# Patient Record
Sex: FEMALE
[De-identification: names, ages, dates, MRNs, and addresses within clinical notes are randomized per-mention and may not be internally consistent; named-entity substitution may affect disease eponyms.]

---

## 2019-12-20 LAB
BASOPHILS # (AUTO): 0.1 K/MM3 (ref 0–0.1)
BASOPHILS NFR BLD AUTO: 2.8 % (ref 0–1.8)
EOSINOPHIL # BLD AUTO: 0.1 K/MM3 (ref 0–0.4)
EOSINOPHIL NFR BLD AUTO: 2.4 % (ref 0–4.3)
HCT VFR BLD CALC: 31 % (ref 30.3–42.9)
HGB BLD-MCNC: 10 GM/DL (ref 10.1–14.3)
LYMPHOCYTES # BLD AUTO: 1.4 K/MM3 (ref 1.2–5.4)
LYMPHOCYTES NFR BLD AUTO: 43 % (ref 13.4–35)
MCHC RBC AUTO-ENTMCNC: 32 % (ref 30–34)
MCV RBC AUTO: 86 FL (ref 79–97)
MONOCYTES # (AUTO): 0.2 K/MM3 (ref 0–0.8)
MONOCYTES % (AUTO): 7 % (ref 0–7.3)
PLATELET # BLD: 226 K/MM3 (ref 140–440)
RBC # BLD AUTO: 3.6 M/MM3 (ref 3.65–5.03)

## 2019-12-20 NOTE — ANESTHESIA CONSULTATION
Anesthesia Consult and Med Hx


Date of service: 12/24/19





- Airway


Anesthetic Teeth Evaluation: Good


ROM Head & Neck: Adequate


Mental/Hyoid Distance: Adequate


Mallampati Class: Class II


Intubation Access Assessment: Probably Good





- Pulmonary Exam


CTA: Yes





- Cardiac Exam


Cardiac Exam: RRR





- Pre-Operative Health Status


ASA Pre-Surgery Classification: ASA2


Proposed Anesthetic Plan: General


Nerve Block: TAP Block





- Pulmonary


Hx Smoking: No


Hx Respiratory Symptoms: No





- Cardiovascular System


Hx Hypertension: No





- Central Nervous System


CVA: No





- Gastrointestinal


Hx Gastroesophageal Reflux Disease: Yes (controlled)





- Endocrine


Hx Renal Disease: No


Hx Liver Disease: No


Hx Non-Insulin Dependent Diabetes: No


Hx Thyroid Disease: No





- Hematic


Hx Anemia: Yes





- Additional Comments


Anesthesia Medical History Comments: No hx anesthetic complications.

## 2019-12-21 NOTE — HISTORY AND PHYSICAL REPORT
History of Present Illness


Date of examination: 19


Chief complaint: 


 1. Excessive and frequent menstruation with irregular cycle


2. Fibroids of  uterus; Intramural


3. Dysmenorrhea


4. 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





History of present illness: 


Past Pregnancy History 


   :      0








GYN History 


Operations: Myomectomy ()


Breast Biopsy:  ()(L) benign


Abnormal PAP: negative





Infection History 


HIV Risk Eval: no


Hx of STD: None





Active Medications (reviewed today):


MULTIVITAMINS ORAL CAPSULE (MULTIPLE VITAMIN) 


IRON TABLET (FERROUS GLUCONATE TABS) 





Current Allergies (reviewed today):


No known allergies








Past Medical History:


   Reviewed history from 2019 and no changes required:


      Anemia


      Herniated L4-L5()





Past Surgical History:


   Reviewed history from 2019 and no changes required:


      Myomectomy ()


      Breast Biopsy:  ()(L) benign





Family History Summary: 


   Reviewed history and no changes required: 2019


MGM - Has Family History of Lung Cancer - MGGM - Entered On: 2019


Uncle - Has Family History of Lung Cancer - MGG uncle - Entered On: 2019


Other family member - Has No Family History of Biliary Tract Cancer - Entered 

On: 2019


Other family member - Has No Family History of Breast Cancer - Entered On: 

2019


Other family member - Has No Family History of Brain Cancer - Entered On: 

2019


Other family member - Has No Family History of Colon Cancer - Entered On: 

2019


Other family member - Has No Family History of Spontaneous DVT-PE - Entered On: 

2019


Other family member - Has No Family History of Kidney/Urinary Tract Cancer - 

Entered On: 2019


Other family member - Has No Family History of Ovarvian Cancer - Entered On: 

2019


Other family member - Has No Family History of Pancreatic Cancer - Entered On: 

2019


Other family member - Has No Family History of Stomach Cancer - Entered On: 

2019


Other family member - Has No Family History of Small Bowel Cancer - Entered On: 

2019


Other family member - Has No Family History of Uterine Cancer - Entered On: 

2019





General Comments - FH:


MGF multiple myeloma





Social History:


   Reviewed history from 2019 and no changes required:


      Patient is single


      


      Smoking History:


      Patient has never smoked.








Risk Factors: 





Smoked Tobacco Use:  Never smoker


Smokeless Tobacco Use:  Never


Drug use:  no


HIV high-risk behavior:  no


Alcohol use:  no


Exercise:  no


Seatbelt use:  100 %





Previous Tobacco Use: Signed On - 2019


Smoked Tobacco Use:  Never smoker


Smokeless Tobacco Use:  Never


Drug use:  no


HIV high-risk behavior:  no





Previous Alcohol Use: Signed On - 2019


Alcohol use:  no


Exercise:  no


Seatbelt use:  100 %





Mammogram History:


   Date of Last Mammogram:  2019





PAP Smear History:


   Date of Last PAP Smear:  2019














Physical Exam 


Appearance: well developed, well nourished, no acute distress





Other Exams 


Lungs: no rales, rhonchi, or wheezes


Heart: S1, S2, no murmur, rub, or gallop


Appearance:  pfannesntiel scar





Genitourinary Exam 


Uterus: enlarged; fixed











Impression & Recommendations:





Problem # 1:  Excessive and frequent menstruation with irregular cycle (ICD-

626.6) (BYY42-U05.1)





The following medications were removed from the medication list:


   Ibuprofen 800 Mg Oral Tablet (Ibuprofen) ..... 1 po tid (prn)





Her updated medication list for this problem includes:


   Multivitamins Oral Capsule (Multiple vitamin)





Diagnosis explained to patient . Discussed with patient various medical, 

surgical and radiological therapies common for treatment including, but not 

limited to, myomectomy,  hysterectomy and uterine artery embolization.  

Discussed risks and benefits of laparotomy, laparoscopy, vaginal and robotic 

assisted approaches for hysterectomies. Patient desires definitive treatment in 

the form of robot assisted laparoscopic total hysterectomy.    The risks and 

alternatives for this surgery were reviewed with the patient.  She was informed 

of  the risks of the surgery including, but not limited to, pain, infection, b

leeding possibly heavy enough to require a blood transfusion with associated 

risks of infections (hepatitis and HIV) and transfusion reactions, possible 

damage to bowel, bladder or ureter(s). Patient understands that this surgery 

with make her sterile. Indications to abort a robotic/laparoscopic procedure and

perform an open procedure were explained. Patient understands if her ovaries are

removed she will become menopausal. Patient advised the small risks of spreading

of malignancy if morcellation  is required during the surgery patient 

understands and approves performing if necessary. Questions answered. Consent 

reviewed and signed


The patient was instructed/informed the following:


   The normal length of hospital stay for this procedure.


   Nothing to eat or drink after midnight the evening prior to surgery. 


   Clear liquids the day before surgery.  


   Pre-op instruction sheets given. 


Wound care instructions given.








Problem # 2:  Fibroids of  uterus; Intramural (ICD-218.1) (FRU28-T69.1)


Diagnosis explained to patient . Questions answered. Discussed with patient mary

ious medical, surgical and radioloigal therapies common for treatment: 

Hormonal/medical therapy, fibroid embolization, removal of fibroids or 

hysterectomy She desires to proceed with hysterectomy


The following medications were removed from the medication list:


   Ibuprofen 800 Mg Oral Tablet (Ibuprofen) ..... 1 po tid (prn)





Counseling and coordination of care was >50% of the face to face time. The total

face to face time for this visit was ~40 minutes. 





Problem # 3:  Dysmenorrhea (ICD-625.3) (EUJ42-A96.6)


It was extensively explained to her that her pain may persist, recur or change 

in nature due to the difficulty with diagnosis chronic pelvic pain or 

development of adhesions. She declined other treatment options at this time. 

Questions were encouraged and answered


She desires ovarian conservation. She was informed she may require surgery later

to have her ovaries removed for a benign or mailgnant condition.


She was informed she may require a supracervical hysterectomy to avoid injury to

surrounding organs.


The following medications were removed from the medication list:


   Ibuprofen 800 Mg Oral Tablet (Ibuprofen) ..... 1 po tid (prn)





Her updated medication list for this problem includes:


   Multivitamins Oral Capsule (Multiple vitamin)








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











Medications Added to Medication List This Visit:


1)  Multivitamins Oral Capsule (Multiple vitamin)


2)  Multi Vitamins 


3)  Iron 


4)  Iron Tablet (Ferrous gluconate tabs)








]








Medications and Allergies


                                    Allergies











Allergy/AdvReac Type Severity Reaction Status Date / Time


 


No Known Allergies Allergy   Unverified 19 13:44











                                Home Medications











 Medication  Instructions  Recorded  Confirmed  Last Taken  Type


 


Iron [Iron 18 MG TAB] 18 mg PO QDAY 19 Unknown History


 


Multivitamin [Multiple Vitamins] 1 each PO DAILY 19 Unknown 

History











Active Meds: 


Active Medications





Celecoxib (Celebrex)  200 mg PO PREOP NR


   Stop: 19 23:59


Famotidine (Pepcid)  20 mg IV PREOP ONE


   Stop: 19 17:07


Fentanyl (Sublimaze)  100 mcg IV ONCE PRN


   PRN Reason: sedation for nerve block


Gabapentin (Gabapentin)  300 mg PO PREOP NR


   Stop: 19 23:59


Lactated Ringer's (Lactated Ringers)  1,000 mls @ 100 mls/hr IV AS DIRECT ELSY


Midazolam HCl (Versed)  2 mg IV PREOP NR


   Stop: 19 23:59











Exam





- Constitutional


Vitals: 


                                        











Temp Pulse Resp BP Pulse Ox


 


 97.5 F L  64   18   134/67   99 


 


 19 12:30  19 12:30  19 12:30  19 12:30  19 12:30














Results





- Labs


CBC & Chem 7: 


                                 19 12:35








Assessment and Plan





- Patient Problems


(1) Excessive and frequent menstruation with irregular cycle


Status: Chronic   





(2) Intramural leiomyoma of uterus


Status: Acute   





(3) Dysmenorrhea


Status: Chronic   





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


Status: Chronic

## 2019-12-24 ENCOUNTER — HOSPITAL ENCOUNTER (INPATIENT)
Dept: HOSPITAL 5 - 3A | Age: 49
LOS: 2 days | Discharge: HOME | DRG: 743 | End: 2019-12-26
Attending: OBSTETRICS & GYNECOLOGY | Admitting: OBSTETRICS & GYNECOLOGY
Payer: COMMERCIAL

## 2019-12-24 DIAGNOSIS — N92.1: ICD-10-CM

## 2019-12-24 DIAGNOSIS — K21.9: ICD-10-CM

## 2019-12-24 DIAGNOSIS — Z80.1: ICD-10-CM

## 2019-12-24 DIAGNOSIS — D25.1: Primary | ICD-10-CM

## 2019-12-24 DIAGNOSIS — D50.0: ICD-10-CM

## 2019-12-24 DIAGNOSIS — N94.6: ICD-10-CM

## 2019-12-24 PROCEDURE — 0HB7XZZ EXCISION OF ABDOMEN SKIN, EXTERNAL APPROACH: ICD-10-PCS | Performed by: OBSTETRICS & GYNECOLOGY

## 2019-12-24 PROCEDURE — 88307 TISSUE EXAM BY PATHOLOGIST: CPT

## 2019-12-24 PROCEDURE — 0UT70ZZ RESECTION OF BILATERAL FALLOPIAN TUBES, OPEN APPROACH: ICD-10-PCS | Performed by: OBSTETRICS & GYNECOLOGY

## 2019-12-24 PROCEDURE — 85018 HEMOGLOBIN: CPT

## 2019-12-24 PROCEDURE — 0UT90ZZ RESECTION OF UTERUS, OPEN APPROACH: ICD-10-PCS | Performed by: OBSTETRICS & GYNECOLOGY

## 2019-12-24 PROCEDURE — 81025 URINE PREGNANCY TEST: CPT

## 2019-12-24 PROCEDURE — 36415 COLL VENOUS BLD VENIPUNCTURE: CPT

## 2019-12-24 PROCEDURE — 0UT00ZZ RESECTION OF RIGHT OVARY, OPEN APPROACH: ICD-10-PCS | Performed by: OBSTETRICS & GYNECOLOGY

## 2019-12-24 PROCEDURE — 88302 TISSUE EXAM BY PATHOLOGIST: CPT

## 2019-12-24 PROCEDURE — 86901 BLOOD TYPING SEROLOGIC RH(D): CPT

## 2019-12-24 PROCEDURE — 86850 RBC ANTIBODY SCREEN: CPT

## 2019-12-24 PROCEDURE — 86900 BLOOD TYPING SEROLOGIC ABO: CPT

## 2019-12-24 PROCEDURE — 88305 TISSUE EXAM BY PATHOLOGIST: CPT

## 2019-12-24 PROCEDURE — 64450 NJX AA&/STRD OTHER PN/BRANCH: CPT

## 2019-12-24 PROCEDURE — 85014 HEMATOCRIT: CPT

## 2019-12-24 PROCEDURE — 85025 COMPLETE CBC W/AUTO DIFF WBC: CPT

## 2019-12-24 RX ADMIN — CEFAZOLIN SCH MLS/HR: 330 INJECTION, POWDER, FOR SOLUTION INTRAMUSCULAR; INTRAVENOUS at 17:45

## 2019-12-24 RX ADMIN — KETOROLAC TROMETHAMINE SCH MG: 30 INJECTION, SOLUTION INTRAMUSCULAR at 19:54

## 2019-12-24 NOTE — OPERATIVE REPORT
Operative Report


Operative Report: 





Date:      12/24/2019





Preoperative diagnosis:   1. Excessive and frequent menstruation with irregular 

cycle


         2. Fibroids of  uterus; Intramural


         3. Dysmenorrhea


         4. 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


         





Postoperative diagnosis:   1. Excessive and frequent menstruation with irregular

cycle


         2. Fibroids of  uterus; Intramural


         3. Dysmenorrhea


         4. 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


         5.  Right ovarian cyst





Procedure:      1.  Total abdominal hysterectomy


         2.  Bilateral salpingectomy


         3.  Right ovarian cystectomy





Surgeon:      Lucy De Los Santos MD





Assistant:      Basilia Bennett CSA





Anesthesiologist:      Dr. Brooks





Anesthesia:      General anesthesia





EBL:   350 mL; Cell Saver 125 mL packed red blood cells





Findings:      Exam under anesthesia revealed 18 weeks fixed uterus





Procedure:   After risk, benefits, complications, consequences and alternatives 

for this procedure were discussed with the patient and she voiced her 

understanding and desire to proceed, she was taken to the OR and placed in the 

supine position.  General anesthesia was induced.  A Morel catheter was 

introduced into her bladder.  She was then prepped and draped in the usual 

sterile fashion.  Timeout was performed.  Keloid scar was excised.  A 

Pfannenstiel incision was made and extended to the fascia which was incised and 

extended lateral direction.  The overlying fascia was sharply dissected away 

from the underlying rectus muscles in the superior inferior direction.  The 

midline was entered with both blunt and sharp dissection.  The uterus was then 

elevated through the incision.  The O'Dudley O'Foley self retaining retractor

was placed.  The bowel was secured in the upper moist laparotomy sponges and 

abdominal blade of the retractor.  The bladder blade was then placed. .    Using

the Enseal tissue sealer device, the round ligaments were then clamped, 

cauterized and incised bilaterally.  With both blunt and sharp dissection the 

bladder flap was created.  Attention was turned to the broad ligament, where the

the utero-ovarian ligaments were both  clamped, cauterized and incised 

bilaterally.   The uterine vessels were then skeletonized bilaterally.  The 

uterine vessels were then  clamped, cauterized and incised bilaterally  The 

cardinal ligaments were then  clamped, cauterized and incised bilaterally.  The 

uterosacral ligaments were then clamped, cut and suture ligated using 0 Vicryl 

with a Lalo stitch.  The midline was closed using 0 Vicryl in 2 interrupted 

figure-of-eight stitches.  Bilateral salpingectomy was performed. Each tube was 

sent to pathology in a separate container. The pelvis was then irrigated 

copiously with warm normal saline.  The ureters were noted to be peristaltic and

away from the operative field.   Hemostasis was noted.  Platelet rich plasma  

was applied to the operative field.  Then a membrane of platelet poor plasma was

applied to the operative field.  Again hemostasis was noted.  The bladder and 

abdominal blades were removed.  Laparotomy sponges were removed.  Counts were 

correct 3.  The remainder of the platelet poor plasma was applied to the bowel.

 The rectus muscles were approximated using 0 Vicryl in 3 interrupted  simple 

stitches of 0 Vicryl.  Once hemostasis was noted the fascia was reapproximated 

from distal to midline using 0 Vicryl in a simple running stitch.  Adipose 

tissue was reapproximated using 0 Vicryl  interrupted stitch fashion.  The skin 

incision was approximated using 4-0 Monocryl in a subcuticular manner.





Patient tolerated procedure well.  Patient was taken to recovery room in stable 

condition additional drainage clearly urine through Morel catheter.

## 2019-12-25 LAB
HCT VFR BLD CALC: 30 % (ref 30.3–42.9)
HGB BLD-MCNC: 9.6 GM/DL (ref 10.1–14.3)

## 2019-12-25 RX ADMIN — KETOROLAC TROMETHAMINE SCH MG: 30 INJECTION, SOLUTION INTRAMUSCULAR at 11:43

## 2019-12-25 RX ADMIN — MORPHINE SULFATE PRN MG: 4 INJECTION, SOLUTION INTRAMUSCULAR; INTRAVENOUS at 15:24

## 2019-12-25 RX ADMIN — KETOROLAC TROMETHAMINE SCH MG: 30 INJECTION, SOLUTION INTRAMUSCULAR at 04:13

## 2019-12-25 RX ADMIN — CEFAZOLIN SCH MLS/HR: 330 INJECTION, POWDER, FOR SOLUTION INTRAMUSCULAR; INTRAVENOUS at 02:23

## 2019-12-25 RX ADMIN — MORPHINE SULFATE PRN MG: 4 INJECTION, SOLUTION INTRAMUSCULAR; INTRAVENOUS at 00:17

## 2019-12-25 RX ADMIN — HYDROCODONE BITARTRATE AND ACETAMINOPHEN PRN EACH: 5; 325 TABLET ORAL at 20:12

## 2019-12-25 NOTE — PROGRESS NOTE
Assessment and Plan





- Patient Problems


(1) S/P total abdominal hysterectomy


Current Visit: Yes   Status: Acute   


Plan to address problem: 


Continue post surgery orders








(2) Status post bilateral salpingectomy


Current Visit: Yes   Status: Acute   





(3) Excessive and frequent menstruation with irregular cycle


Current Visit: No   Status: Resolved   





(4) Intramural leiomyoma of uterus


Current Visit: No   Status: Resolved   





(5) Dysmenorrhea


Current Visit: No   Status: Resolved   





(6) Iron deficiency anemia secondary to blood loss (chronic)


Current Visit: No   Status: Chronic   





Subjective


Date of service: 12/25/19


Patient Reports: Positive: no new complaints, tolerating liquids well, voiding 

w/o difficulty, flatus, afebrile


Narrative: 





POD#1, no complaints, resting in bed, with visitors at bedside





Objective


                               Vital Signs - 12hr











  12/25/19 12/25/19 12/25/19





  01:10 06:05 08:05


 


Temperature 97.9 F 98.3 F 


 


Pulse Rate 71 70 


 


Respiratory 20 16 20





Rate   


 


Blood Pressure 121/56 126/61 


 


O2 Sat by Pulse 98 98 





Oximetry   














  12/25/19 12/25/19





  08:06 09:52


 


Temperature  97.6 F


 


Pulse Rate  75


 


Respiratory 18 18





Rate  


 


Blood Pressure  128/56


 


O2 Sat by Pulse  99





Oximetry  














- General physical appearance


well developed, well nourished, no distress





- Respiratory


normal expansion, normal respiratory effort, clear to auscultation





- Abdomen


soft, not tender, bowel sounds normal, surgical scars (c/d/i, no s/s infection 

or hematoma)





- Integumentary


no rash





- Neurologic


normal coordination





- Psychiatric


oriented to time, oriented to person, oriented to place, speech is normal, 

memory intact





- Labs





                                 12/25/19 05:43

## 2019-12-26 VITALS — DIASTOLIC BLOOD PRESSURE: 66 MMHG | SYSTOLIC BLOOD PRESSURE: 132 MMHG

## 2019-12-26 RX ADMIN — HYDROCODONE BITARTRATE AND ACETAMINOPHEN PRN EACH: 5; 325 TABLET ORAL at 04:48

## 2019-12-26 RX ADMIN — HYDROCODONE BITARTRATE AND ACETAMINOPHEN PRN EACH: 5; 325 TABLET ORAL at 08:41

## 2019-12-26 NOTE — DISCHARGE SUMMARY
Providers





- Providers


Date of Admission: 


12/24/19 08:42





Date of discharge: 12/26/19


Attending physician: 


ALEJANDRO RUSSELL





Primary care physician: 


PRIMARY CARE MD








Hospitalization


Condition: Good


Procedures: 





[facility]


Hospital course: 





Normal


Disposition: DC-01 TO HOME OR SELFCARE





- Discharge Diagnoses


(1) S/P total abdominal hysterectomy


Status: Acute   





(2) Status post bilateral salpingectomy


Status: Acute   





(3) Excessive and frequent menstruation with irregular cycle


Status: Resolved   





(4) Intramural leiomyoma of uterus


Status: Resolved   





(5) Dysmenorrhea


Status: Resolved   





(6) Iron deficiency anemia secondary to blood loss (chronic)


Status: Chronic   





Core Measure Documentation





- Palliative Care


Palliative Care/ Comfort Measures: Not Applicable





- Core Measures


Any of the following diagnoses?: none





Exam





- Physical Exam


Narrative exam: 





sitting in bed, mother present, no complaints. Desires discharge home. 





- Constitutional


Vitals: 


                                        











Temp Pulse Resp BP Pulse Ox


 


 98 F   67   16   128/57   95 


 


 12/26/19 01:03  12/26/19 01:03  12/26/19 01:03  12/26/19 01:03  12/26/19 01:03











General appearance: Present: no acute distress





- Neck


Neck: Present: supple





- Respiratory


Respiratory effort: normal


Respiratory: bilateral: CTA





- Cardiovascular


Rhythm: regular





- Extremities


Extremities: no ischemia, No edema





- Abdominal


General gastrointestinal: Present: soft, non-tender, non-distended, normal bowel

sounds





- Integumentary


Integumentary: Present: clear, warm, dry (Incision c/d/i, no s/s infection or 

hematoma)





- Musculoskeletal


Musculoskeletal: strength equal bilaterally





- Psychiatric


Psychiatric: appropriate mood/affect, intact judgment & insight, memory intact, 

cooperative





- Neurologic


Neurologic: CNII-XII intact





Plan


Activity: other (No sex. Ambulate ~1mile on your property a day, void and use 

your incentive spirometer every hour while awake. No driving. No exercise. )


Weight Bearing Status: Full Weight Bearing


Diet: regular (Eat small meal frequently. Drink 90oz water day. Avoid fatty, 

spicy high sodium food. )


Wound: open to air, keep clean and dry


Special Instructions: no heavy lifting (Greater than 25lbs)


Care Plan Goals: 


Full recovery with complications


Plan of Treatment: 


See discharge instructions


Health Concerns: 


Prevention of VTE and infection discussed


Follow up with: 


ALEJANDRO RUSSELL MD [Staff Physician] - 7 Days (as scheduled)


Prescriptions: 


Ibuprofen [Motrin 800 MG tab] 800 mg PO Q8H PRN #30 tablet


 PRN Reason: Pain, Mild (1-3)


HYDROcodone/APAP 5-325 [Norco 5-325 mg TAB] 1 each PO Q6H PRN #20 tablet


 PRN Reason: Pain, Moderate (4-6)